# Patient Record
Sex: FEMALE | Race: WHITE | NOT HISPANIC OR LATINO | Employment: UNEMPLOYED | URBAN - METROPOLITAN AREA
[De-identification: names, ages, dates, MRNs, and addresses within clinical notes are randomized per-mention and may not be internally consistent; named-entity substitution may affect disease eponyms.]

---

## 2021-05-24 ENCOUNTER — RECORDS - HEALTHEAST (OUTPATIENT)
Dept: ADMINISTRATIVE | Facility: CLINIC | Age: 41
End: 2021-05-24

## 2021-05-26 ENCOUNTER — RECORDS - HEALTHEAST (OUTPATIENT)
Dept: ADMINISTRATIVE | Facility: CLINIC | Age: 41
End: 2021-05-26

## 2021-05-31 ENCOUNTER — RECORDS - HEALTHEAST (OUTPATIENT)
Dept: ADMINISTRATIVE | Facility: CLINIC | Age: 41
End: 2021-05-31

## 2021-11-22 ENCOUNTER — HOSPITAL ENCOUNTER (EMERGENCY)
Facility: HOSPITAL | Age: 41
Discharge: HOME | End: 2021-11-22
Admitting: PHYSICIAN ASSISTANT
Payer: COMMERCIAL

## 2021-11-22 VITALS
SYSTOLIC BLOOD PRESSURE: 127 MMHG | DIASTOLIC BLOOD PRESSURE: 74 MMHG | RESPIRATION RATE: 18 BRPM | HEART RATE: 107 BPM | TEMPERATURE: 98.5 F | OXYGEN SATURATION: 100 % | WEIGHT: 256 LBS

## 2021-11-22 DIAGNOSIS — S81.812A LACERATION OF LEG, LEFT, INITIAL ENCOUNTER: ICD-10-CM

## 2021-11-22 LAB — INR PPP: 2.58 (ref 0.9–1.15)

## 2021-11-22 PROCEDURE — 90715 TDAP VACCINE 7 YRS/> IM: CPT | Performed by: PHYSICIAN ASSISTANT

## 2021-11-22 PROCEDURE — 250N000009 HC RX 250: Performed by: PHYSICIAN ASSISTANT

## 2021-11-22 PROCEDURE — 99283 EMERGENCY DEPT VISIT LOW MDM: CPT | Mod: 25

## 2021-11-22 PROCEDURE — 12001 RPR S/N/AX/GEN/TRNK 2.5CM/<: CPT

## 2021-11-22 PROCEDURE — 90471 IMMUNIZATION ADMIN: CPT | Performed by: PHYSICIAN ASSISTANT

## 2021-11-22 PROCEDURE — 250N000011 HC RX IP 250 OP 636: Performed by: PHYSICIAN ASSISTANT

## 2021-11-22 PROCEDURE — 85610 PROTHROMBIN TIME: CPT | Performed by: PHYSICIAN ASSISTANT

## 2021-11-22 PROCEDURE — 36415 COLL VENOUS BLD VENIPUNCTURE: CPT | Performed by: PHYSICIAN ASSISTANT

## 2021-11-22 RX ORDER — BACITRACIN ZINC 500 [USP'U]/G
OINTMENT TOPICAL ONCE
Status: COMPLETED | OUTPATIENT
Start: 2021-11-22 | End: 2021-11-22

## 2021-11-22 RX ADMIN — BACITRACIN ZINC 1 APPLICATION.: 500 OINTMENT TOPICAL at 11:56

## 2021-11-22 RX ADMIN — CLOSTRIDIUM TETANI TOXOID ANTIGEN (FORMALDEHYDE INACTIVATED), CORYNEBACTERIUM DIPHTHERIAE TOXOID ANTIGEN (FORMALDEHYDE INACTIVATED), BORDETELLA PERTUSSIS TOXOID ANTIGEN (GLUTARALDEHYDE INACTIVATED), BORDETELLA PERTUSSIS FILAMENTOUS HEMAGGLUTININ ANTIGEN (FORMALDEHYDE INACTIVATED), BORDETELLA PERTUSSIS PERTACTIN ANTIGEN, AND BORDETELLA PERTUSSIS FIMBRIAE 2/3 ANTIGEN 0.5 ML: 5; 2; 2.5; 5; 3; 5 INJECTION, SUSPENSION INTRAMUSCULAR at 11:55

## 2021-11-22 ASSESSMENT — ENCOUNTER SYMPTOMS
WEAKNESS: 0
NUMBNESS: 1
WOUND: 1
BRUISES/BLEEDS EASILY: 1

## 2021-11-22 NOTE — ED NOTES
Nursing Assessment: Skin Integrity: Patient presents here with a laceration to the distal pretibial area of her left leg incurred by a broken piece of glass. She has a dressing that was applied prior to arrival. Removal of the dressing reveals a laceration that is 2 cm long with gaped edges with the widest portion about .5 cm at the middle. Revealing subcutaneous tissue. Bleeding has stopped.

## 2021-11-22 NOTE — ED TRIAGE NOTES
Pt's left lower leg was cut on glass from a picture frame this morning. Takes warfarin for DVT history. Arrives with leg wrapped.

## 2021-11-22 NOTE — DISCHARGE INSTRUCTIONS
4 sutures have been placed to close your laceration. These should be removed in about 10 days.  Would recommend follow up at your clinic to have them removed.  Wash daily with soap and water. Apply thin layer of bacitracin or neosporin daily. Avoid soaking the wound (no swimming in pools/lakes). Monitor for any signs of infection and return to the ER if you notice any redness, pain, swelling, drainage.

## 2021-11-22 NOTE — ED PROVIDER NOTES
EMERGENCY DEPARTMENT ENCOUNTER      NAME: Ibis Garcia  AGE: 41 year old female  YOB: 1980  MRN: [id number]  EVALUATION DATE & TIME: 11/22/2021 10:40 AM    PCP: Leatha Perez    ED PROVIDER: Alessandra Huddleston PA-C      Chief Complaint   Patient presents with     Laceration       FINAL IMPRESSION:  1. Laceration of leg, left, initial encounter          MEDICAL DECISION MAKING:    Pertinent Labs & Imaging studies reviewed. (See chart for details)  41 year old female presents to the Emergency Department for evaluation of laceration to left shin when she tripped and cut her leg on a picture frame. She is anticoagulated on warfarin for DVT history.     Vitals reviewed and notable for borderline tachycardia, likely secondary to being anxious about laceration. On exam, 2 cm horizontal laceration of left anterior distal lower leg. Adipose tissue exposed. No visualized tendon involvement or retained foreign body. 5/5 strength with flexion and extension of left ankle. Brisk capillary refill. Mild oozing from wound that is easily controlled with direct pressure. Peripheral neuropathy at baseline.     Laceration was approximated using #4 non absorbable sutures that will require removal in about 10 days. Discussed wound care and return precautions. INR today is therapeutic at 2.58. No continued bleeding. Tdap was updated and patient was discharged home in stable condition.     0 minutes of critical care time     ED COURSE:  10:55 AM I met with the patient, obtained history, performed an initial exam, and discussed options and plan for diagnostics and treatment here in the ED. PPE: surgical mask, eye protection, gloves.   11:21 AM Performed laceration repair. Patient discharged after being provided with extensive anticipatory guidance and given return precautions, importance of PCP follow-up emphasized.    At the conclusion of the encounter I discussed the results of all of the tests and the disposition. The  questions were answered. The patient acknowledged understanding and was agreeable with the care plan.     MEDICATIONS GIVEN IN THE EMERGENCY:  Medications   Tdap (tetanus-diphtheria-acell pertussis) (ADACEL) injection 0.5 mL (0.5 mLs Intramuscular Given 11/22/21 1155)   bacitracin ointment (1 Application Topical Given 11/22/21 1156)       NEW PRESCRIPTIONS STARTED AT TODAY'S ER VISIT  New Prescriptions    No medications on file     =================================================================    HPI:    Patient information was obtained from: Patient    Use of Interpretor: N/A        Ibis SAMIR Garcia is a 41 year old female with a pertinent history of DVT on warfarin who presents to this ED via walk-in for evaluation of a laceration.     Patient tripped when getting out of bed and cut her left lower leg on a picture frame. The frame did not break and she denies chance of retained foreign body. She did not hit her head or sustain any other injuries. She is on warfarin. She does not recall when her last INR was noting she missed last weeks appointment. She has peripheral neuropathy at baseline. Reports being prediabetic. She does not recall when her last tetanus was but states it was >5 years ago.     REVIEW OF SYSTEMS:  Review of Systems   Skin: Positive for wound (left shin).   Neurological: Positive for numbness (bilateral lower legs with baseline neuropathy). Negative for weakness.   Hematological: Bruises/bleeds easily (warfarin).   All other systems reviewed and are negative.      PAST MEDICAL HISTORY:  No past medical history on file.    PAST SURGICAL HISTORY:  No past surgical history on file.      CURRENT MEDICATIONS:    No current facility-administered medications for this encounter.    Current Outpatient Medications:      carBAMazepine (TEGRETOL  XR) 100 MG 12 hr tablet, [CARBAMAZEPINE (TEGRETOL  XR) 100 MG 12 HR TABLET] Take 100 mg by mouth 2 (two) times a day., Disp: , Rfl:      levothyroxine (SYNTHROID,  LEVOTHROID) 25 MCG tablet, [LEVOTHYROXINE (SYNTHROID, LEVOTHROID) 25 MCG TABLET] Take 25 mcg by mouth daily., Disp: , Rfl:       ALLERGIES:  Allergies   Allergen Reactions     Codeine Anxiety     Latex Rash       FAMILY HISTORY:  No family history on file.    SOCIAL HISTORY:   Social History     Socioeconomic History     Marital status: Single     Spouse name: Not on file     Number of children: Not on file     Years of education: Not on file     Highest education level: Not on file   Occupational History     Not on file   Tobacco Use     Smoking status: Not on file     Smokeless tobacco: Not on file   Substance and Sexual Activity     Alcohol use: Not on file     Drug use: Not on file     Sexual activity: Not on file   Other Topics Concern     Not on file   Social History Narrative     Not on file     Social Determinants of Health     Financial Resource Strain: Not on file   Food Insecurity: Not on file   Transportation Needs: Not on file   Physical Activity: Not on file   Stress: Not on file   Social Connections: Not on file   Intimate Partner Violence: Not on file   Housing Stability: Not on file       VITALS:  Patient Vitals for the past 24 hrs:   BP Temp Temp src Pulse Resp SpO2 Weight   11/22/21 1015 127/74 98.5  F (36.9  C) Temporal 107 18 100 % 116.1 kg (256 lb)       PHYSICAL EXAM  General: Appears in no acute distress, awake, alert, interactive. Obese  Eyes: Conjunctivae non-injected. Sclera anicteric.  HENT: Atraumatic.  Neck: Supple.  Respiratory/Chest: Respiration unlabored.  Abdomen: non distended  Musculoskeletal: Normal extremities. No edema or erythema.  Skin: Normal color. No rash or diaphoresis. 2 cm horizontal laceration of left anterior distal lower leg. Adipose tissue exposed. No visualized tendon involvement or retained foreign body. 5/5 strength with flexion and extension of left ankle. Brisk capillary refill. Mild oozing from wound that is easily controlled with direct pressure. Peripheral  neuropathy at baseline.   Neurologic: Face symmetric, moves all extremities spontaneously. Speech clear.  Psychiatric: Oriented to person, place, and time. Affect appropriate.      PROCEDURES:   PROCEDURE: Laceration Repair   INDICATIONS: Laceration   PROCEDURE PROVIDER:  Alessandra Huddleston PA-C   SITE: Left anterior distal leg   TYPE/SIZE: superficial, clean and no foreign body visualized  2 cm (total length)   FUNCTIONAL ASSESSMENT: Distal sensation, circulation, motor and tendon function intact   MEDICATION: 3 mLs of 1% Lidocaine with epinephrine   PREPARATION: scrubbing and irrigation with Normal saline and Hibiclens   DEBRIDEMENT: no debridement and wound explored, no foreign body found   CLOSURE:  Wound was closed in   one layer: Skin closed with simple interrupted stitches of 4-0 Ethilon    Total number of sutures/staples placed: 4       I, Kalli Mendoza, am serving as a scribe to document services personally performed by Alessandra Huddleston PA-C based on my observation and the provider's statements to me. I, Alessandra Huddleston PA-C attest that Kalli Mendoza is acting in a scribe capacity, has observed my performance of the services and has documented them in accordance with my direction.    Alessandra Huddleston PA-C  Emergency Medicine  [facility]  11/22/2021           Alessandra Huddleston PA-C  11/22/21 [id number]